# Patient Record
Sex: FEMALE | Race: BLACK OR AFRICAN AMERICAN | URBAN - METROPOLITAN AREA
[De-identification: names, ages, dates, MRNs, and addresses within clinical notes are randomized per-mention and may not be internally consistent; named-entity substitution may affect disease eponyms.]

---

## 2020-01-09 ENCOUNTER — HISTORICAL (OUTPATIENT)
Dept: ADMINISTRATIVE | Facility: HOSPITAL | Age: 35
End: 2020-01-09

## 2020-01-09 LAB
ABS NEUT (OLG): 8.91 X10(3)/MCL (ref 2.1–9.2)
ALBUMIN SERPL-MCNC: 3.4 GM/DL (ref 3.4–5)
ALBUMIN/GLOB SERPL: 0.8 RATIO (ref 1.1–2)
ALP SERPL-CCNC: 151 UNIT/L (ref 45–117)
ALT SERPL-CCNC: 20 UNIT/L (ref 12–78)
AST SERPL-CCNC: 11 UNIT/L (ref 15–37)
BASOPHILS # BLD AUTO: 0.1 X10(3)/MCL (ref 0–0.2)
BASOPHILS NFR BLD AUTO: 0 %
BILIRUB SERPL-MCNC: 0.4 MG/DL (ref 0.2–1)
BILIRUBIN DIRECT+TOT PNL SERPL-MCNC: <0.1 MG/DL (ref 0–0.2)
BILIRUBIN DIRECT+TOT PNL SERPL-MCNC: >0.3 MG/DL
BUN SERPL-MCNC: 9 MG/DL (ref 7–18)
CALCIUM SERPL-MCNC: 9 MG/DL (ref 8.5–10.1)
CHLORIDE SERPL-SCNC: 110 MMOL/L (ref 98–107)
CO2 SERPL-SCNC: 28 MMOL/L (ref 21–32)
CREAT SERPL-MCNC: 1 MG/DL (ref 0.6–1.3)
EOSINOPHIL # BLD AUTO: 0.3 X10(3)/MCL (ref 0–0.9)
EOSINOPHIL NFR BLD AUTO: 2 %
ERYTHROCYTE [DISTWIDTH] IN BLOOD BY AUTOMATED COUNT: 15.4 % (ref 11.5–14.5)
GLOBULIN SER-MCNC: 4.3 GM/ML (ref 2.3–3.5)
GLUCOSE SERPL-MCNC: 144 MG/DL (ref 74–106)
HAV IGM SERPL QL IA: NONREACTIVE
HBV CORE IGM SERPL QL IA: NONREACTIVE
HBV SURFACE AG SERPL QL IA: NEGATIVE
HCT VFR BLD AUTO: 43 % (ref 35–46)
HCV AB SERPL QL IA: NONREACTIVE
HGB BLD-MCNC: 13.6 GM/DL (ref 12–16)
HIV 1+2 AB+HIV1 P24 AG SERPL QL IA: NONREACTIVE
IMM GRANULOCYTES # BLD AUTO: 0.03 10*3/UL
IMM GRANULOCYTES NFR BLD AUTO: 0 %
LYMPHOCYTES # BLD AUTO: 4 X10(3)/MCL (ref 0.6–4.6)
LYMPHOCYTES NFR BLD AUTO: 29 %
MCH RBC QN AUTO: 27.1 PG (ref 26–34)
MCHC RBC AUTO-ENTMCNC: 31.6 GM/DL (ref 31–37)
MCV RBC AUTO: 85.8 FL (ref 80–100)
MONOCYTES # BLD AUTO: 0.8 X10(3)/MCL (ref 0.1–1.3)
MONOCYTES NFR BLD AUTO: 6 %
NEG CONT SPOT COUNT: NORMAL
NEUTROPHILS # BLD AUTO: 8.91 X10(3)/MCL (ref 2.1–9.2)
NEUTROPHILS NFR BLD AUTO: 63 %
PANEL A SPOT COUNT: 0
PANEL B SPOT COUNT: 0
PLATELET # BLD AUTO: 326 X10(3)/MCL (ref 130–400)
PMV BLD AUTO: 9.4 FL (ref 7.4–10.4)
POS CONT SPOT COUNT: NORMAL
POTASSIUM SERPL-SCNC: 4.3 MMOL/L (ref 3.5–5.1)
PROT SERPL-MCNC: 7.7 GM/DL (ref 6.4–8.2)
RBC # BLD AUTO: 5.01 X10(6)/MCL (ref 4–5.2)
SODIUM SERPL-SCNC: 143 MMOL/L (ref 136–145)
T-SPOT.TB: NORMAL
WBC # SPEC AUTO: 14.1 X10(3)/MCL (ref 4.5–11)

## 2020-10-08 ENCOUNTER — HISTORICAL (OUTPATIENT)
Dept: ADMINISTRATIVE | Facility: HOSPITAL | Age: 35
End: 2020-10-08

## 2020-10-11 LAB — FINAL CULTURE: NORMAL

## 2021-01-07 ENCOUNTER — HISTORICAL (OUTPATIENT)
Dept: ADMINISTRATIVE | Facility: HOSPITAL | Age: 36
End: 2021-01-07

## 2021-01-07 LAB
ABS NEUT (OLG): 7.25 X10(3)/MCL (ref 2.1–9.2)
ALBUMIN SERPL-MCNC: 3.3 GM/DL (ref 3.5–5)
ALBUMIN/GLOB SERPL: 0.8 RATIO (ref 1.1–2)
ALP SERPL-CCNC: 180 UNIT/L (ref 40–150)
ALT SERPL-CCNC: 15 UNIT/L (ref 0–55)
AST SERPL-CCNC: 11 UNIT/L (ref 5–34)
BASOPHILS # BLD AUTO: 0.1 X10(3)/MCL (ref 0–0.2)
BASOPHILS NFR BLD AUTO: 0 %
BILIRUB SERPL-MCNC: 0.4 MG/DL
BILIRUBIN DIRECT+TOT PNL SERPL-MCNC: 0.2 MG/DL (ref 0–0.5)
BILIRUBIN DIRECT+TOT PNL SERPL-MCNC: 0.2 MG/DL (ref 0–0.8)
BUN SERPL-MCNC: 11 MG/DL (ref 7–18.7)
CALCIUM SERPL-MCNC: 9 MG/DL (ref 8.4–10.2)
CHLORIDE SERPL-SCNC: 104 MMOL/L (ref 98–107)
CO2 SERPL-SCNC: 23 MMOL/L (ref 22–29)
CREAT SERPL-MCNC: 1.09 MG/DL (ref 0.55–1.02)
EOSINOPHIL # BLD AUTO: 0.3 X10(3)/MCL (ref 0–0.9)
EOSINOPHIL NFR BLD AUTO: 3 %
ERYTHROCYTE [DISTWIDTH] IN BLOOD BY AUTOMATED COUNT: 13.8 % (ref 11.5–14.5)
GLOBULIN SER-MCNC: 4.2 GM/DL (ref 2.4–3.5)
GLUCOSE SERPL-MCNC: 336 MG/DL (ref 74–100)
HAV IGM SERPL QL IA: NONREACTIVE
HBV CORE AB SERPL QL IA: NONREACTIVE
HBV CORE IGM SERPL QL IA: NONREACTIVE
HBV SURFACE AG SERPL QL IA: NONREACTIVE
HCT VFR BLD AUTO: 43.8 % (ref 35–46)
HCV AB SERPL QL IA: NONREACTIVE
HGB BLD-MCNC: 13.7 GM/DL (ref 12–16)
HIV 1+2 AB+HIV1 P24 AG SERPL QL IA: NONREACTIVE
IMM GRANULOCYTES # BLD AUTO: 0.02 10*3/UL
IMM GRANULOCYTES NFR BLD AUTO: 0 %
LYMPHOCYTES # BLD AUTO: 3.5 X10(3)/MCL (ref 0.6–4.6)
LYMPHOCYTES NFR BLD AUTO: 30 %
MCH RBC QN AUTO: 26.8 PG (ref 26–34)
MCHC RBC AUTO-ENTMCNC: 31.3 GM/DL (ref 31–37)
MCV RBC AUTO: 85.5 FL (ref 80–100)
MONOCYTES # BLD AUTO: 0.5 X10(3)/MCL (ref 0.1–1.3)
MONOCYTES NFR BLD AUTO: 4 %
NEG CONT SPOT COUNT: NORMAL
NEUTROPHILS # BLD AUTO: 7.25 X10(3)/MCL (ref 2.1–9.2)
NEUTROPHILS NFR BLD AUTO: 62 %
PANEL A SPOT COUNT: 0
PANEL B SPOT COUNT: 0
PLATELET # BLD AUTO: 350 X10(3)/MCL (ref 130–400)
PMV BLD AUTO: 10.9 FL (ref 7.4–10.4)
POS CONT SPOT COUNT: NORMAL
POTASSIUM SERPL-SCNC: 4.5 MMOL/L (ref 3.5–5.1)
PROT SERPL-MCNC: 7.5 GM/DL (ref 6.4–8.3)
RBC # BLD AUTO: 5.12 X10(6)/MCL (ref 4–5.2)
SODIUM SERPL-SCNC: 137 MMOL/L (ref 136–145)
T-SPOT.TB: NORMAL
WBC # SPEC AUTO: 11.7 X10(3)/MCL (ref 4.5–11)

## 2021-07-22 ENCOUNTER — HISTORICAL (OUTPATIENT)
Dept: ADMINISTRATIVE | Facility: HOSPITAL | Age: 36
End: 2021-07-22

## 2021-07-22 LAB
ALBUMIN SERPL-MCNC: 3 GM/DL (ref 3.5–5)
ALBUMIN/GLOB SERPL: 0.7 RATIO (ref 1.1–2)
ALP SERPL-CCNC: 180 UNIT/L (ref 40–150)
ALT SERPL-CCNC: 24 UNIT/L (ref 0–55)
AST SERPL-CCNC: 17 UNIT/L (ref 5–34)
BILIRUB SERPL-MCNC: 0.3 MG/DL
BILIRUBIN DIRECT+TOT PNL SERPL-MCNC: 0.1 MG/DL (ref 0–0.5)
BILIRUBIN DIRECT+TOT PNL SERPL-MCNC: 0.2 MG/DL (ref 0–0.8)
BUN SERPL-MCNC: 8.5 MG/DL (ref 7–18.7)
CALCIUM SERPL-MCNC: 9.4 MG/DL (ref 8.4–10.2)
CHLORIDE SERPL-SCNC: 102 MMOL/L (ref 98–107)
CHOLEST SERPL-MCNC: 216 MG/DL
CHOLEST/HDLC SERPL: 6 {RATIO} (ref 0–5)
CO2 SERPL-SCNC: 26 MMOL/L (ref 22–29)
CREAT SERPL-MCNC: 1.11 MG/DL (ref 0.55–1.02)
EST CREAT CLEARANCE SER (OHS): 57.01 ML/MIN
GLOBULIN SER-MCNC: 4.3 GM/DL (ref 2.4–3.5)
GLUCOSE SERPL-MCNC: 452 MG/DL (ref 74–100)
HDLC SERPL-MCNC: 35 MG/DL (ref 35–60)
LDLC SERPL CALC-MCNC: 119 MG/DL (ref 50–140)
POC BETA-HCG (QUAL): NEGATIVE
POTASSIUM SERPL-SCNC: 4.6 MMOL/L (ref 3.5–5.1)
PROT SERPL-MCNC: 7.3 GM/DL (ref 6.4–8.3)
SODIUM SERPL-SCNC: 135 MMOL/L (ref 136–145)
TRIGL SERPL-MCNC: 308 MG/DL (ref 37–140)
VLDLC SERPL CALC-MCNC: 62 MG/DL

## 2021-07-25 LAB — FINAL CULTURE: NORMAL

## 2021-08-19 LAB — POC BETA-HCG (QUAL): NEGATIVE

## 2021-09-01 ENCOUNTER — HISTORICAL (OUTPATIENT)
Dept: FAMILY MEDICINE | Facility: CLINIC | Age: 36
End: 2021-09-01

## 2022-04-10 ENCOUNTER — HISTORICAL (OUTPATIENT)
Dept: ADMINISTRATIVE | Facility: HOSPITAL | Age: 37
End: 2022-04-10

## 2022-04-26 VITALS
BODY MASS INDEX: 39.56 KG/M2 | SYSTOLIC BLOOD PRESSURE: 110 MMHG | OXYGEN SATURATION: 100 % | HEIGHT: 62 IN | DIASTOLIC BLOOD PRESSURE: 73 MMHG | WEIGHT: 214.94 LBS

## 2022-05-03 NOTE — HISTORICAL [facility] CERNER
This is a historical note converted from Kylee. Formatting and pictures may have been removed.  Please reference Kylee for original formatting and attached multimedia. Chief Complaint  hidradenitis  History of Present Illness  Ms. Pauline Larkin?is a?35 Years?F?presenting to?[facility]?for followup dissecting cellulitis and hidradenitis. Currently on Humira qWeek (Thursday). Due?for dose today. ?States that Humira making scalp worse and inflamed after injection. Patient report prior yeast infection of vagina at last visit treated with Fluconazole weekly, out for 1 month. Previously tried Monistat. Reports Hibiclens use?3 times week. Allergic reaction to amoxicillin with angioedema. Currently taking doxycycline 100 qD. Missed May appointment.  Review of Systems  Constitutional: Denies fever, chills  Cardiovascular: Denies chest pain  Respiratory: Denies shortness of breath  Gastrointestinal: Denies nausea, vomiting, constipation, diarrhea  Physical Exam  Vitals & Measurements  T:?36.7? ?C (Oral)? HR:?90(Peripheral)? RR:?18? BP:?115/80? SpO2:?100%?  HT:?158.00?cm? WT:?98.700?kg? BMI:?39.54?  General:?Alert and oriented,?no acute distress  Respiratory:?Able to speak in full sentences, no coughing  Neurologic: No issues with articulation  Psych: Calm, cooperative  ?   Skin Exam:  Left axilla: Large cyst, tender to palpation  Right posterior shoulder: Large draining nodulocystic?lesion on right scapula?  Scalp: Plaque present on left posterior scalp with multiple cyst within hair line  Pustular cystic acne on back, shoulder, breast  Assessment/Plan  1.?Dissecting cellulitis of scalp?L66.3  recommend continuing Humira 40 mg subq  ?Continue Hibiclens 2 times weekly  Ordered:  ?  2.?Hidradenitis?L73.2  Transition to Bactrim DS twice daily  discontinue doxycycline  amoxicillin added to allergy list  Ordered:  ?  3.?Nodulocystic acne?L70.0  Would culture of right posterior scalp  Bactrim DS twice daily  Stop  doxycycline  Will start process for Accutane  Patient counselled on 2 effective forms for birth control. Patient?instructed to inform office in 4 weeks  UPT and fasting CMP and lipids today  ?  Ordered:  Clinic Follow up, *Est. 08/19/21 3:00:00 CDT, I nemesio, Order for future visit, Dissecting cellulitis of scalp  Hidradenitis  Nodulocystic acne, [facility]  Comprehensive Metabolic Panel, Routine collect, *Est. 07/22/21 3:00:00 CDT, Blood, Order for future visit, *Est. Stop date 07/22/21 3:00:00 CDT, Lab Collect, Nodulocystic acne  Hidradenitis  Dissecting cellulitis of scalp  Lipid Panel, Routine collect, *Est. 07/22/21 3:00:00 CDT, Blood, Order for future visit, *Est. Stop date 07/22/21 3:00:00 CDT, Lab Collect, Nodulocystic acne  Hidradenitis  Dissecting cellulitis of scalp, 07/22/21 8:03:00 CDT  Urine Pregnancy POC, 07/22/21 8:03:00 CDT, [facility]  ?  4.?Vaginal yeast infection?B37.3  Diflucan 150 mg tab, 1 pill every other week for duration of antibiotics therapy  ?  Orders:  chlorhexidine topical, See Instructions, Topical application twice weekly, # 240 mL, 4 Refill(s), Pharmacy: [place] [facility] [id number], 158, cm, Height/Length Dosing, 07/22/21 7:26:00 CDT, 98.7, kg, Weight Dosing, 07/22/21 7:26:00 CDT  fluconazole, See Instructions, 1 tab(s) Oral every other week to prevent vagianl yeast infection, # 4 tab(s), 6 Refill(s), Pharmacy: [place] [facility] [id number], 158, cm, Height/Length Dosing, 07/22/21 7:26:00 CDT, 98.7, kg, Weight Dosing, 07/22/21 7:26:00 CDT  sulfamethoxazole-trimethoprim, 1 tab(s), Oral, BID, # 60 tab(s), 6 Refill(s), Pharmacy: [place] [facility] [id number], 158, cm, Height/Length Dosing, 07/22/21 7:26:00 CDT, 98.7, kg, Weight Dosing, 07/22/21 7:26:00 CDT  Return to clinic for followup in 1 month, or sooner if needed.  ?   Allison Myers M.D.  [facility], [facility]  I saw and evaluated the patient with resident. A diagnosis or working diagnosis was made. Appropriate work-up  was formulated, and a treatment plan was developed. Appropriate teaching and discussion of the case was conducted with the resident physician.   Problem List/Past Medical History  Ongoing  Dissecting cellulitis of scalp  Hidradenitis  Morbid obesity  Tobacco user  Medications  amLODIPine 10 mg oral tablet, 10 mg= 1 tab(s), Oral, Daily  atorvastatin 20 mg oral tablet, 20 mg= 1 tab(s), Oral, Daily  doxycycline hyclate 100 mg oral capsule, 100 mg= 1 cap(s), Oral, Daily  Hibiclens 4% topical soap, TOP, Once  Humira 40 mg/0.8 mL subcutaneous kit, 40 mg= 0.8 mL, Subcutaneous, qWeek, 6 refills  Lantus Solostar Pen 100 units/mL subcutaneous solution, Subcutaneous, Daily  traZODONE 50 mg oral tablet ( Desyrel ), 50 mg, Oral, BID  Allergies  amoxicillin?(Angioedema)      Primary diagnosis: Cystic Acne of back and chest requiring escalating medical therapy  ?  Allison Myers M.D.  [facility], -  I saw and evaluated the patient with resident. A diagnosis or working diagnosis was made. Appropriate work-up was formulated, and a treatment plan was developed. Appropriate teaching and discussion of the case was conducted with the resident physician.

## 2022-05-03 NOTE — HISTORICAL [facility] CERNER
This is a historical note converted from Kylee. Formatting and pictures may have been removed.  Please reference Kylee for original formatting and attached multimedia. Chief Complaint  derm f/u appt for cellulitis of scalp, no improvement, getting worse  History of Present Illness  34-year-old female presents today to clinic?for?routine follow-up.? States she is currently not taking?anything?for her?dissecting cellulitis of the scalp. ?States she was?given Bactrim.  Patient has a history of diabetes?and is currently taking?glipizide.? Patient doesnt have?fatigue,?headaches,?breast tenderness,?lightheadedness, diarrhea, or?constipation.? Patient states there is no chance?that she is pregnant?and denies any future pregnancies.  Review of Systems  Constitutional: no fever, no chills, no weight loss, no shortness of breath, denies chest pain  Skin: as per HPI  ?  Physical Exam  Vitals & Measurements  T:?36.9? ?C (Oral)? HR:?76(Peripheral)? RR:?20? BP:?127/81?  HT:?158.00?cm? WT:?106.700?kg? BMI:?42.74?  General: The patient is pleasant and cooperative and in no acute distress.  Skin:?The skin is warm to touch. ?There is good turgor.?  Draining nodules, cysts, and sinus tracts?in her scalp. Old firm hypertrophic keloid scars with alopecia of the scalp.  Assessment/Plan  1.?Hidradenitis suppurativa?L73.2  -Patient is consistent with some sings and symptoms of?[place]- Hidradenitis suppurativa, Nodular acne, and Active Dissecting cellulitis of the scalp  -Will speak with her PCP at [facility] about considering switching Glipizide to Metformin and starting a regimen of 500mg TID?  -Considering starting Humira in the future, ordered CBC, CMP, HIV, Hep panel, TB spot for completion in 1-2 weeks  Ordered:  Blood Culture, 10/08/20 9:29:00 CDT, Order for future visit, Now collect, Blood, Scalp, Stop date 10/08/20 9:29:00 CDT, Hidradenitis suppurativa  CBC w/ Auto Diff, Routine collect, *Est. 10/08/20 3:00:00  CDT, Blood, Order for future visit, *Est. Stop date 10/08/20 3:00:00 CDT, Lab Collect, Hidradenitis suppurativa, 10/08/20 9:30:00 CDT  Clinic Follow up, *Est. 10/15/20 3:00:00 CDT, lab work, Order for future visit, Hidradenitis suppurativa, [facility]  Clinic Follow up, *Est. 11/05/20 8:00:00 CST, Order for future visit, Hidradenitis suppurativa, [facility]  Comprehensive Metabolic Panel, Routine collect, *Est. 10/08/20 3:00:00 CDT, Blood, Order for future visit, *Est. Stop date 10/08/20 3:00:00 CDT, Lab Collect, Hidradenitis suppurativa  Hepatitis A Antibody IgM, Routine collect, 10/08/20 10:58:00 CDT, Blood, Order for future visit, Stop date 10/08/20 10:58:00 CDT, Lab Collect, Hidradenitis suppurativa, 10/08/20 10:58:00 CDT  Hepatitis B Core Antibody Total, Routine collect, 10/08/20 10:58:00 CDT, Blood, Order for future visit, Stop date 10/08/20 10:58:00 CDT, Lab Collect, Hidradenitis suppurativa, 10/08/20 10:58:00 CDT  Hepatitis C Virus RNA Quant [id number], Routine collect, 10/08/20 10:59:00 CDT, Blood, Order for future visit, Stop date 10/08/20 10:59:00 CDT, Lab Collect, Hidradenitis suppurativa, 10/08/20 10:59:00 CDT  HIV 1 and 2, Routine collect, *Est. 10/08/20 3:00:00 CDT, Blood, Order for future visit, *Est. Stop date 10/08/20 3:00:00 CDT, Lab Collect, Hidradenitis suppurativa, 10/08/20 9:31:00 CDT  T Spot Test for M. tuberculosis, Routine collect, *Est. 10/08/20 3:00:00 CDT, Blood, Order for future visit, *Est. Stop date 10/08/20 3:00:00 CDT, Lab Collect, Hidradenitis suppurativa, 10/08/20 9:31:00 CDT  zzzHpatitis B Virus Core IgM Antibody, Routine collect, 10/08/20 10:58:00 CDT, Blood, Order for future visit, Stop date 10/08/20 10:58:00 CDT, Lab Collect, Hidradenitis suppurativa, 10/08/20 10:58:00 CDT  ?  2.?Dissecting cellulitis of scalp?L66.3  -Patient has active dissecting cellulitis of the scalp  -Draining nodules, cysts, sinus in scalp. Old firm hypertrophic  keloid scars consistent with alopecia of the scalp noticed today  -Today bacterial culture was performed, scalp. ?Will follow up with up patient with results when they come back in  -Continue with Hibiclens shampoo once a week, leave in hair for 5 minutes and then rinse out  -Ordered low dose Prednisone 10mg  ?  Orders:  doxycycline, 100 mg = 1 cap(s), Oral, BID, take one pill with breakfast and the second pill with supper, # 60 cap(s), 1 Refill(s), Pharmacy: [facility], 158, cm, Height/Length Dosing, 10/08/20 8:36:00 CDT, 106.7, kg, Weight Dosing, 10/08/20 8:...  predniSONE, 10 mg = 1 tab(s), Oral, Daily, Take one pill a day at breakfat, # 30 tab(s), 0 Refill(s), Pharmacy: [facility], 158, cm, Height/Length Dosing, 10/08/20 8:36:00 CDT, 106.7, kg, Weight Dosing, 10/08/20 8:36:00 CDT  Referrals  Clinic Follow up, *Est. 11/05/20 8:00:00 CST, Order for future visit, Hidradenitis suppurativa, [facility]  Clinic Follow up, *Est. 10/15/20 3:00:00 CDT, lab work, Order for future visit, Hidradenitis suppurativa, [facility]   Problem List/Past Medical History  Ongoing  Dissecting cellulitis of scalp  Morbid obesity  Tobacco user  Historical  No qualifying data  Medications  doxycycline hyclate 100 mg oral capsule, 100 mg= 1 cap(s), Oral, BID, 1 refills  prednisONE 10 mg oral tablet, 10 mg= 1 tab(s), Oral, Daily  Allergies  No Known Allergies  Social History  Abuse/Neglect  No, No, Yes, 10/08/2020  Alcohol  Current, 01/09/2020  Employment/School  Unemployed, 01/09/2020  Exercise  Home/Environment  Lives with Alone, grnadmother., 01/09/2020  Nutrition/Health  Regular, 01/09/2020  Sexual  Sexually active: No. Gender Identity Identifies as female., 01/09/2020  Substance Use  Never, 12/05/2017  Tobacco  5-9 cigarettes (between 1/4 to 1/2 pack)/day in last 30 days, Cigarettes, No, Household tobacco concerns: No. Smokeless Tobacco Use: Never.,  10/08/2020  Family History  Father: History is unknown  Mother: History is unknown  Health Maintenance  Health Maintenance  ???Pending?(in the next year)  ??? ??OverDue  ??? ? ? ?Depression Screening due??07/12/19??and every 1??year(s)  ??? ? ? ?Smoking Cessation due??01/01/20??and every 1??year(s)  ??? ? ? ?Alcohol Misuse Screening due??01/02/20??and every 1??year(s)  ??? ??Due?  ??? ? ? ?Cervical Cancer Screening due??10/08/20??and every?  ??? ? ? ?Tetanus Vaccine due??10/08/20??and every 10??year(s)  ??? ??Due In Future?  ??? ? ? ?Obesity Screening not due until??01/01/21??and every 1??year(s)  ??? ? ? ?Diabetes Screening not due until??01/08/21??and every 1??year(s)  ??? ? ? ?ADL Screening not due until??01/09/21??and every 1??year(s)  ???Satisfied?(in the past 1 year)  ??? ??Satisfied?  ??? ? ? ?ADL Screening on??01/09/20.??Satisfied by Earnestine Martinez LPN  ??? ? ? ?Blood Pressure Screening on??10/08/20.??Satisfied by Riana Reid LPN  ??? ? ? ?Body Mass Index Check on??10/08/20.??Satisfied by Riana Reid LPN  ??? ? ? ?Diabetes Screening on??01/09/20.??Satisfied by Hawa Henderson  ??? ? ? ?Obesity Screening on??10/08/20.??Satisfied by Riana Reid LPN  ?      I saw and evaluated the patient with resident. A diagnosis or working diagnosis was made. Appropriate work-up was formulated, and a treatment plan was developed. Appropriate teaching and discussion of the case was conducted with the resident physician.

## 2022-05-03 NOTE — HISTORICAL [facility] CERNER
This is a historical note converted from Kylee. Formatting and pictures may have been removed.  Please reference Kylee for original formatting and attached multimedia. Chief Complaint  derm visit hiradentis  History of Present Illness  35-year-old female with PMH Hidradenitis of scalp, Dissecting cellulitis of scalp?presents today to clinic?for?routine follow-up. Last visit she was told to start Humira, but was unable to start this medication due to pharmacy being unable to fill the medicine. She finished taking prednisone, and is no longer taking doxycycline due to having no refills. Essentially the patient is on no medications today, and she is also not taking Hibiclens as well. She states she is in more pain since last visit, and has new cystic lesions appearing on her scalp.  Review of Systems  Negative except as stated in HPI  Physical Exam  Vitals & Measurements  T:?36.5? ?C (Oral)? HR:?85(Peripheral)? RR:?25? BP:?132/88? SpO2:?100%?  HT:?158.00?cm? WT:?106.000?kg? BMI:?42.46?  General: NAD  Heart: RRR  Lungs: CTA b/l  MSK: ROM wnl  Skin:?Draining nodules, cysts, and sinus tracts?in her scalp. Old firm hypertrophic keloid scars with alopecia  Assessment/Plan  1.?Dissecting cellulitis of scalp?  - Will restart doxycycline 100 mg BID  - Now s/p prednisone taper  - Wash scalp with Hibiclens and leave for 5 minutes then rinse.  ?  2.?Hidradenitis?  - Need to start Humira, will order labs and T spot prior to initiation  - To start humira: initiate with 160 mg day 1, 80 mg day 14, 40mg on?Day 28, then 40 mg qweekly. Order sent to pharmacy  ?   RTC 12 weeks   Problem List/Past Medical History  Ongoing  Dissecting cellulitis of scalp  Morbid obesity  Tobacco user  Historical  No qualifying data  Medications  prednisONE 5 mg oral tablet, 5 mg= 1 tab(s), Oral, Daily,? ?Not taking  Allergies  No Known Allergies  Social History  Abuse/Neglect  No, No, Yes, 11/05/2020  Alcohol  Current,  01/09/2020  Employment/School  Unemployed, 01/09/2020  Exercise  Home/Environment  Lives with Alone, grnadmother., 01/09/2020  Nutrition/Health  Regular, 01/09/2020  Sexual  Sexually active: No. Gender Identity Identifies as female., 01/09/2020  Substance Use  Never, 12/05/2017  Tobacco  5-9 cigarettes (between 1/4 to 1/2 pack)/day in last 30 days, Cigarettes, No, Household tobacco concerns: No. Smokeless Tobacco Use: Never., 11/05/2020  Family History  Father: History is unknown  Mother: History is unknown  Health Maintenance  Health Maintenance  ???Pending?(in the next year)  ??? ??OverDue  ??? ? ? ?Depression Screening due??07/12/19??and every 1??year(s)  ??? ? ? ?Diabetes Maintenance-HgbA1c due??10/02/19??and every 1??year(s)  ??? ? ? ?Influenza Vaccine due??10/01/20??and every 1??day(s)  ??? ? ? ?Smoking Cessation due??01/01/21??and every 1??year(s)  ??? ??Due?  ??? ? ? ?Obesity Screening due??01/01/21??and every 1??year(s)  ??? ? ? ?Alcohol Misuse Screening due??01/02/21??and every 1??year(s)  ??? ? ? ?Cervical Cancer Screening due??01/07/21??Unknown Frequency  ??? ? ? ?Diabetes Maintenance-Eye Exam due??01/07/21??Unknown Frequency  ??? ? ? ?Diabetes Maintenance-Foot Exam due??01/07/21??Unknown Frequency  ??? ? ? ?Tetanus Vaccine due??01/07/21??and every 10??year(s)  ??? ??Due In Future?  ??? ? ? ?ADL Screening not due until??01/09/21??and every 1??year(s)  ??? ? ? ?Blood Pressure Screening not due until??11/05/21??and every 1??year(s)  ??? ? ? ?Body Mass Index Check not due until??11/05/21??and every 1??year(s)  ??? ? ? ?Hypertension Management-Blood Pressure not due until??11/05/21??and every 1??year(s)  ??? ? ? ?Hypertension Management-BMP not due until??12/05/21??and every 1??year(s)  ???Satisfied?(in the past 1 year)  ??? ??Satisfied?  ??? ? ? ?ADL Screening on??01/09/20.??Satisfied by Earnestine Martinez LPN  ??? ? ? ?Blood Pressure Screening on??11/05/20.??Satisfied by Riana Reid LPN  ??? ? ?  ?Body Mass Index Check on??11/05/20.??Satisfied by Riana Reid LPN  ??? ? ? ?Diabetes Screening on??01/09/20.??Satisfied by Hawa Henderson  ??? ? ? ?Hypertension Management-Blood Pressure on??11/05/20.??Satisfied by Riana Reid LPN  ??? ? ? ?Obesity Screening on??11/05/20.??Satisfied by Riana Reid LPN  ?      I saw and evaluated the patient with resident. A diagnosis or working diagnosis was made. Appropriate work-up was formulated, and a treatment plan was developed. Appropriate teaching and discussion of the case was conducted with the resident physician.   T spot returned negative,  nursing staff informed that it is OK to start Humira injections with dosing described above. Patient to return to clinic for nurse visit this week for Humira injection education

## 2022-05-03 NOTE — HISTORICAL [facility] CERNER
This is a historical note converted from Kylee. Formatting and pictures may have been removed.  Please reference Kylee for original formatting and attached multimedia. Chief Complaint  derm f/u cellulitis of the scalp  History of Present Illness  32-year-old female presented to derm clinic for f/u visit of dissecting cellulitis of scalp.  ?   Seen previously for dissecting cellulitis of scalp.  patient switched to [facility] to manage diabetes, but was not able to follow up here. Last visit here was July 2018.  Pt has been getting doxycycline filled with Dr. Reyez, but was referred back here for continued care  Patient has not been on Abx for 2 months. Has not been on steroids for 1 year.  Lesions on scalp have gotten much worse over that time  Painful at night, preventing patient from sleeping.  c/o painful draining lesions over his back and under breast  Review of Systems  General: Denies?fever and?chills  Respiratory:?denies cough,?denies SOB  Cardio: Denies palpitations, tachycardia or cyanosis  GI: Denies N/V/D, hematemesis, hematochezia, melena, or acid reflux  Skin: See HPI  Physical Exam  Vitals & Measurements  T:?36.5? ?C (Oral)? HR:?81(Peripheral)? RR:?20? BP:?127/85? SpO2:?98%?  HT:?158?cm? WT:?103.4?kg? BMI:?41.46?  General: well-appearing, sitting comfortably in chair in no acute distress  Resp: Lungs clear bilaterally. No wheezes or rhonchi.  Heart: RRR, no murmurs. No JVD.  Skin: Multiple abscess seen on scalp and nape of neck. Some with purulent drainage. scared sinus tracks. Abscesses with scarring seen under breast, upper groin and back.  Assessment/Plan  1.?Dissecting cellulitis of scalp?L66.3  -Bactrim DS liquid. Patient has vomiting with Pills.  -8 tsp/24 hr: 2 tsp breakfast, 3 tsp with lunch, 3 tsp supper  -Will not start prednisone at this visit. Will consider at next visit.  -Hibiclens lather on scalp. every other day 3-5 minute shampoo lather.  Ordered:  CBC w/ Auto  Diff, Routine collect, *Est. 01/09/20 3:00:00 CST, Blood, Order for future visit, *Est. Stop date 01/09/20 3:00:00 CST, Lab Collect, Dissecting cellulitis of scalp  Hydradenitis, 01/10/20 5:00:00 CST  Clinic Follow up, *Est. 01/23/20 3:00:00 CST, 2 weeks, Order for future visit, Dissecting cellulitis of scalp  Hydradenitis, [facility]  Comprehensive Metabolic Panel, Routine collect, *Est. 01/09/20 3:00:00 CST, Blood, Order for future visit, *Est. Stop date 01/09/20 3:00:00 CST, Lab Collect, Dissecting cellulitis of scalp  Hydradenitis  Hepatitis Panel, Routine collect, *Est. 01/09/20 3:00:00 CST, Blood, Order for future visit, *Est. Stop date 01/09/20 3:00:00 CST, Lab Collect, Dissecting cellulitis of scalp  Hydradenitis, 01/09/20 8:18:00 CST  HIV 1 and 2, Routine collect, *Est. 01/09/20 3:00:00 CST, Blood, Order for future visit, *Est. Stop date 01/09/20 3:00:00 CST, Lab Collect, Dissecting cellulitis of scalp  Hydradenitis, 01/09/20 8:18:00 CST  T Spot Test for M. tuberculosis, Routine collect, *Est. 01/09/20 3:00:00 CST, Blood, Order for future visit, *Est. Stop date 01/09/20 3:00:00 CST, Lab Collect, Dissecting cellulitis of scalp  Hydradenitis, 01/09/20 8:18:00 CST  ?  2.?Hydradenitis?L73.2  -CBC, CMP, TB-spot, complete hepatitis panel, HIV, today.  -May start Humira if above is unremarkable.  Ordered:  CBC w/ Auto Diff, Routine collect, *Est. 01/09/20 3:00:00 CST, Blood, Order for future visit, *Est. Stop date 01/09/20 3:00:00 CST, Lab Collect, Dissecting cellulitis of scalp  Hydradenitis, 01/10/20 5:00:00 CST  Clinic Follow up, *Est. 01/23/20 3:00:00 CST, 2 weeks, Order for future visit, Dissecting cellulitis of scalp  Hydradenitis, [facility]  Comprehensive Metabolic Panel, Routine collect, *Est. 01/09/20 3:00:00 CST, Blood, Order for future visit, *Est. Stop date 01/09/20 3:00:00 CST, Lab Collect, Dissecting cellulitis of scalp  Hydradenitis  Hepatitis Panel, Routine  collect, *Est. 01/09/20 3:00:00 CST, Blood, Order for future visit, *Est. Stop date 01/09/20 3:00:00 CST, Lab Collect, Dissecting cellulitis of scalp  Hydradenitis, 01/09/20 8:18:00 CST  HIV 1 and 2, Routine collect, *Est. 01/09/20 3:00:00 CST, Blood, Order for future visit, *Est. Stop date 01/09/20 3:00:00 CST, Lab Collect, Dissecting cellulitis of scalp  Hydradenitis, 01/09/20 8:18:00 CST  T Spot Test for M. tuberculosis, Routine collect, *Est. 01/09/20 3:00:00 CST, Blood, Order for future visit, *Est. Stop date 01/09/20 3:00:00 CST, Lab Collect, Dissecting cellulitis of scalp  Hydradenitis, 01/09/20 8:18:00 CST  ?  Orders:  sulfamethoxazole-trimethoprim, See Instructions, take 2 ml with breakfast, 3 ml with lunch, 3 ml with dinner., # 200 mL, 2 Refill(s), Pharmacy: [facility], 158, cm, Height/Length Dosing, 01/09/20 7:31:00 CST, 103.4, kg, Weight Dosing, 01/09/20 7:31:00 CST  ?  RTC 2 weeks  ?  Malik Ruiz MD - HO[facility]  [facility]  Referrals  Clinic Follow up, *Est. 01/23/20 3:00:00 CST, 2 weeks, Order for future visit, Dissecting cellulitis of scalp  Hydradenitis, [facility]   Problem List/Past Medical History  Ongoing  Dissecting cellulitis of scalp  Morbid obesity  Tobacco user  Historical  No qualifying data  Medications  prednisONE 5 mg oral tablet, See Instructions, 1 refills,? ?Not taking  Protonix 40 mg ORAL enteric coated tablet, 40 mg= 1 tab(s), Oral, Daily, 2 refills  sulfamethoxazole-trimethoprim 200 mg-40 mg/5 mL oral suspension, See Instructions, 2 refills  Vitamin D3 2000 intl units oral capsule, 2000 IntUnit= 1 cap(s), Oral, Daily, 1 refills  Allergies  No Known Allergies  Social History  Abuse/Neglect  No, No, Yes, 01/09/2020  Alcohol  Current, 01/09/2020  Employment/School  Unemployed, 01/09/2020  Exercise  Home/Environment  Lives with Alone, grnadmother., 01/09/2020  Nutrition/Health  Regular, 01/09/2020  Sexual  Sexually active: No.  Gender Identity Identifies as female., 01/09/2020  Substance Use  Never, 12/05/2017  Tobacco  5-9 cigarettes (between 1/4 to 1/2 pack)/day in last 30 days, N/A, Household tobacco concerns: No. Smokeless Tobacco Use: Never., 01/09/2020  Family History  Father: History is unknown  Mother: History is unknown      I saw and evaluated the patient with resident. A diagnosis or working diagnosis was made. Appropriate work-up was formulated, and a treatment plan was developed. Appropriate teaching and discussion of the case was conducted with the resident physician.

## 2022-09-18 ENCOUNTER — HISTORICAL (OUTPATIENT)
Dept: ADMINISTRATIVE | Facility: HOSPITAL | Age: 37
End: 2022-09-18

## 2024-04-03 DIAGNOSIS — L02.93 RECURRENT CARBUNCULOSIS: Primary | ICD-10-CM
